# Patient Record
Sex: MALE | Race: WHITE | NOT HISPANIC OR LATINO
[De-identification: names, ages, dates, MRNs, and addresses within clinical notes are randomized per-mention and may not be internally consistent; named-entity substitution may affect disease eponyms.]

---

## 2022-12-27 PROBLEM — Z00.00 ENCOUNTER FOR PREVENTIVE HEALTH EXAMINATION: Status: ACTIVE | Noted: 2022-12-27

## 2022-12-28 ENCOUNTER — APPOINTMENT (OUTPATIENT)
Dept: ORTHOPEDIC SURGERY | Facility: CLINIC | Age: 62
End: 2022-12-28

## 2022-12-28 VITALS — WEIGHT: 185 LBS | HEIGHT: 67 IN | BODY MASS INDEX: 29.03 KG/M2

## 2022-12-28 DIAGNOSIS — Z78.9 OTHER SPECIFIED HEALTH STATUS: ICD-10-CM

## 2022-12-28 PROCEDURE — 72040 X-RAY EXAM NECK SPINE 2-3 VW: CPT

## 2022-12-28 PROCEDURE — 73010 X-RAY EXAM OF SHOULDER BLADE: CPT | Mod: RT

## 2022-12-28 PROCEDURE — 73030 X-RAY EXAM OF SHOULDER: CPT | Mod: RT

## 2022-12-28 RX ORDER — METHYLPREDNISOLONE 4 MG/1
4 TABLET ORAL
Qty: 1 | Refills: 0 | Status: ACTIVE | COMMUNITY
Start: 2022-12-28 | End: 1900-01-01

## 2022-12-28 NOTE — IMAGING
[de-identified] : RIGHT SHOULDER \par No swelling, no ecchymosis, no deformity, no scapular winging.\par No tenderness to palpation over shoulder, AC joint or trapezius.\par Forward flexion to 180; external rotation to 90 degrees (with shoulder abducted); internal rotation to 70 degrees (with shoulder abducted)\par 5/5 supraspinatus, infraspinatus and subscapularis\par Negative Basurto test, negative impingement sign, negative O’Tyrese test.\par Speed’s and yergason negative\par Motor and sensory intact distally\par \par \par CERVICAL:\par No swelling, no ecchymosis\par Right Trapezial and paracervical tenderness to palpation\par Diminished range of motion in all planes; pain radiates down arm with neck rotation\par 5/5 deltoid, biceps, triceps and wrist flexors/extensors\par Positive spurling, negative calero\par Reflexes +2, intact motor distally\par Altered sensation distally\par  [Straightening consistent with spasm] : Straightening consistent with spasm [Disc space narrowing] : Disc space narrowing [Right] : right shoulder [There are no fractures, subluxations or dislocations. No significant abnormalities are seen] : There are no fractures, subluxations or dislocations. No significant abnormalities are seen

## 2022-12-28 NOTE — HISTORY OF PRESENT ILLNESS
[6] : 6 [5] : 5 [Dull/Aching] : dull/aching [Ice] : ice [de-identified] : 12/28/22:  ACUTE ONSET OF RIGHT SHOULDER PAIN SINCE EARLY DECEMBER 2022 AFTER DOING YARDWORK.  NO PRIOR RIGHT SHOULDER ISSUES.  PAIN WORSE WITH REACHING, LIFTING AND AT NIGHT.  REPORTS TO A SHOOTING PAIN DOWN THE RIGTH ARM AT TIMES.  OCC TINGLING AS WELL.  WENT TO THE ER WHERE XRAYS TAKEN AND GIVE ORAL MEDS.  REST, ICE, PERCOCET 5/325 AND NSAIDS PRN WITH NO RELIEF.  [] : no [FreeTextEntry1] : R SHOULDER [FreeTextEntry3] : 3 WEEKS AGO [FreeTextEntry5] : WAS DOING WORK IN HIS YARD AND DEVELOPED PAIN AFTER. WENT TO [place] [facility] 2 WEEKS AGO THEN VU BERNAL-12/26/22. HAS SOME NUMBNESS IN RIGHT IF,MF. WAS PRESCRIBED OXY BY [place] [employer] [de-identified] : ACTIVITY [de-identified] : XR

## 2022-12-28 NOTE — ASSESSMENT
[FreeTextEntry1] : RIGHT CERVICAL RADICULITIS FROM DOING YARD WORK\par CLASSIC SPURLING ON EXAM\par NO RED FLAGS\par MDP AND COURSE OF PT\par F/U WITH OUR SPINE TEAM IN 4 WEEKS IF PERSISTS

## 2023-03-01 ENCOUNTER — APPOINTMENT (OUTPATIENT)
Dept: ORTHOPEDIC SURGERY | Facility: CLINIC | Age: 63
End: 2023-03-01

## 2023-09-06 ENCOUNTER — APPOINTMENT (OUTPATIENT)
Dept: ORTHOPEDIC SURGERY | Facility: CLINIC | Age: 63
End: 2023-09-06
Payer: COMMERCIAL

## 2023-09-06 VITALS — BODY MASS INDEX: 29.03 KG/M2 | WEIGHT: 185 LBS | HEIGHT: 67 IN

## 2023-09-06 DIAGNOSIS — M75.81 OTHER SHOULDER LESIONS, RIGHT SHOULDER: ICD-10-CM

## 2023-09-06 DIAGNOSIS — M75.51 BURSITIS OF RIGHT SHOULDER: ICD-10-CM

## 2023-09-06 DIAGNOSIS — M54.12 RADICULOPATHY, CERVICAL REGION: ICD-10-CM

## 2023-09-06 PROCEDURE — 99213 OFFICE O/P EST LOW 20 MIN: CPT

## 2023-09-06 RX ORDER — MELOXICAM 15 MG/1
15 TABLET ORAL DAILY
Qty: 30 | Refills: 2 | Status: COMPLETED | COMMUNITY
Start: 2023-09-06 | End: 2023-12-05

## 2023-09-06 NOTE — ASSESSMENT
[FreeTextEntry1] : RIGHT CERVICAL RADICULITIS increased demands CLASSIC SPURLING ON EXAM not as severe as prior. discussed possib medrol pack but he does not feel it is that severe will give rx for mobic. if symptoms persist discussed possible mri and pain mgt consult for nayely.

## 2023-09-06 NOTE — HISTORY OF PRESENT ILLNESS
[5] : 5 [Dull/Aching] : dull/aching [6] : 6 [Ice] : ice [de-identified] : 9-6-23-  we saw him in december with acute right upper extremity radiculopathy. He did well with medrol. states over the last few months less severe radicular pain episodiacally right upper extremity into the 3/4 fingers on the right hand with reaching and increased activities.   12/28/22:  ACUTE ONSET OF RIGHT SHOULDER PAIN SINCE EARLY DECEMBER 2022 AFTER DOING YARDWORK.  NO PRIOR RIGHT SHOULDER ISSUES.  PAIN WORSE WITH REACHING, LIFTING AND AT NIGHT.  REPORTS TO A SHOOTING PAIN DOWN THE RIGTH ARM AT TIMES.  OCC TINGLING AS WELL.  WENT TO THE ER WHERE XRAYS TAKEN AND GIVE ORAL MEDS.  REST, ICE, PERCOCET 5/325 AND NSAIDS PRN WITH NO RELIEF.  [] : no [FreeTextEntry1] : R SHOULDER [FreeTextEntry3] : 3 WEEKS AGO [FreeTextEntry5] : WAS DOING WORK IN HIS YARD AND DEVELOPED PAIN AFTER. WENT TO [place] [facility] 2 WEEKS AGO THEN VU BERNAL-12/26/22. HAS SOME NUMBNESS IN RIGHT IF,MF. WAS PRESCRIBED OXY BY [place] [employer] [de-identified] : ACTIVITY [de-identified] : XR [de-identified] : meds

## 2023-10-03 ENCOUNTER — LABORATORY RESULT (OUTPATIENT)
Age: 63
End: 2023-10-03

## 2023-10-03 ENCOUNTER — APPOINTMENT (OUTPATIENT)
Dept: CARDIOLOGY | Facility: CLINIC | Age: 63
End: 2023-10-03
Payer: COMMERCIAL

## 2023-10-03 ENCOUNTER — NON-APPOINTMENT (OUTPATIENT)
Age: 63
End: 2023-10-03

## 2023-10-03 VITALS
TEMPERATURE: 97.3 F | HEIGHT: 67 IN | OXYGEN SATURATION: 97 % | WEIGHT: 183 LBS | DIASTOLIC BLOOD PRESSURE: 80 MMHG | BODY MASS INDEX: 28.72 KG/M2 | HEART RATE: 59 BPM | SYSTOLIC BLOOD PRESSURE: 145 MMHG

## 2023-10-03 DIAGNOSIS — Z00.00 ENCOUNTER FOR GENERAL ADULT MEDICAL EXAMINATION W/OUT ABNORMAL FINDINGS: ICD-10-CM

## 2023-10-03 DIAGNOSIS — Z23 ENCOUNTER FOR IMMUNIZATION: ICD-10-CM

## 2023-10-03 PROCEDURE — 99387 INIT PM E/M NEW PAT 65+ YRS: CPT | Mod: 25

## 2023-10-03 PROCEDURE — 90662 IIV NO PRSV INCREASED AG IM: CPT

## 2023-10-03 PROCEDURE — G0008: CPT

## 2023-10-03 PROCEDURE — 93000 ELECTROCARDIOGRAM COMPLETE: CPT

## 2023-10-04 ENCOUNTER — TRANSCRIPTION ENCOUNTER (OUTPATIENT)
Age: 63
End: 2023-10-04

## 2023-10-04 LAB
25(OH)D3 SERPL-MCNC: 21.7 NG/ML
ALBUMIN SERPL ELPH-MCNC: 4.8 G/DL
ALP BLD-CCNC: 81 U/L
ALT SERPL-CCNC: 34 U/L
ANION GAP SERPL CALC-SCNC: 16 MMOL/L
APO LP(A) SERPL-MCNC: 34.8 NMOL/L
APPEARANCE: CLEAR
AST SERPL-CCNC: 27 U/L
BACTERIA: NEGATIVE /HPF
BILIRUB DIRECT SERPL-MCNC: 0.3 MG/DL
BILIRUB INDIRECT SERPL-MCNC: 1.3 MG/DL
BILIRUB SERPL-MCNC: 1.6 MG/DL
BILIRUBIN URINE: NEGATIVE
BLOOD URINE: NEGATIVE
BUN SERPL-MCNC: 11 MG/DL
CALCIUM SERPL-MCNC: 9.8 MG/DL
CAST: 0 /LPF
CHLORIDE SERPL-SCNC: 100 MMOL/L
CHOLEST SERPL-MCNC: 235 MG/DL
CK SERPL-CCNC: 76 U/L
CO2 SERPL-SCNC: 22 MMOL/L
COLOR: YELLOW
CREAT SERPL-MCNC: 0.82 MG/DL
EGFR: 97 ML/MIN/1.73M2
EPITHELIAL CELLS: 0 /HPF
ESTIMATED AVERAGE GLUCOSE: 209 MG/DL
GLUCOSE QUALITATIVE U: NEGATIVE MG/DL
GLUCOSE SERPL-MCNC: 192 MG/DL
HBA1C MFR BLD HPLC: 8.9 %
HDLC SERPL-MCNC: 50 MG/DL
KETONES URINE: NEGATIVE MG/DL
LDLC SERPL CALC-MCNC: 154 MG/DL
LDLC SERPL DIRECT ASSAY-MCNC: 165 MG/DL
LEUKOCYTE ESTERASE URINE: NEGATIVE
MAGNESIUM SERPL-MCNC: 2.3 MG/DL
MICROSCOPIC-UA: NORMAL
NITRITE URINE: NEGATIVE
NONHDLC SERPL-MCNC: 185 MG/DL
OSMOLALITY SERPL: 296 MOSMOL/KG
PH URINE: 6
PHOSPHATE SERPL-MCNC: 2.3 MG/DL
POTASSIUM SERPL-SCNC: 4.5 MMOL/L
PROT SERPL-MCNC: 7.8 G/DL
PROTEIN URINE: NEGATIVE MG/DL
PSA SERPL-MCNC: 1.02 NG/ML
RED BLOOD CELLS URINE: 0 /HPF
SODIUM SERPL-SCNC: 138 MMOL/L
SPECIFIC GRAVITY URINE: 1.01
T3RU NFR SERPL: 1.2 TBI
T4 FREE SERPL-MCNC: 1.2 NG/DL
T4 SERPL-MCNC: 7.8 UG/DL
TRIGL SERPL-MCNC: 173 MG/DL
TSH SERPL-ACNC: 1.89 UIU/ML
URATE SERPL-MCNC: 6.6 MG/DL
UROBILINOGEN URINE: 0.2 MG/DL
WHITE BLOOD CELLS URINE: 0 /HPF

## 2023-10-12 ENCOUNTER — TRANSCRIPTION ENCOUNTER (OUTPATIENT)
Age: 63
End: 2023-10-12

## 2023-10-26 ENCOUNTER — APPOINTMENT (OUTPATIENT)
Dept: ENDOCRINOLOGY | Facility: CLINIC | Age: 63
End: 2023-10-26
Payer: COMMERCIAL

## 2023-10-26 VITALS
TEMPERATURE: 97 F | DIASTOLIC BLOOD PRESSURE: 80 MMHG | SYSTOLIC BLOOD PRESSURE: 130 MMHG | HEIGHT: 67 IN | OXYGEN SATURATION: 97 % | WEIGHT: 181 LBS | HEART RATE: 74 BPM | BODY MASS INDEX: 28.41 KG/M2

## 2023-10-26 DIAGNOSIS — E78.5 HYPERLIPIDEMIA, UNSPECIFIED: ICD-10-CM

## 2023-10-26 DIAGNOSIS — E11.9 TYPE 2 DIABETES MELLITUS W/OUT COMPLICATIONS: ICD-10-CM

## 2023-10-26 DIAGNOSIS — E55.9 VITAMIN D DEFICIENCY, UNSPECIFIED: ICD-10-CM

## 2023-10-26 DIAGNOSIS — I10 ESSENTIAL (PRIMARY) HYPERTENSION: ICD-10-CM

## 2023-10-26 LAB — GLUCOSE BLDC GLUCOMTR-MCNC: 93

## 2023-10-26 PROCEDURE — 99214 OFFICE O/P EST MOD 30 MIN: CPT

## 2023-10-26 PROCEDURE — 82962 GLUCOSE BLOOD TEST: CPT

## 2023-10-26 RX ORDER — ERGOCALCIFEROL 1.25 MG/1
1.25 MG CAPSULE, LIQUID FILLED ORAL
Qty: 6 | Refills: 1 | Status: ACTIVE | COMMUNITY
Start: 2023-10-26 | End: 1900-01-01

## 2023-10-26 RX ORDER — BLOOD-GLUCOSE METER
W/DEVICE KIT MISCELLANEOUS
Qty: 1 | Refills: 0 | Status: ACTIVE | COMMUNITY
Start: 2023-10-26 | End: 1900-01-01

## 2023-10-26 RX ORDER — BLOOD-GLUCOSE METER
KIT MISCELLANEOUS
Qty: 90 | Refills: 0 | Status: ACTIVE | COMMUNITY
Start: 2023-10-26 | End: 1900-01-01

## 2023-10-26 RX ORDER — ALCOHOL ANTISEPTIC PADS
PADS, MEDICATED (EA) TOPICAL
Qty: 90 | Refills: 0 | Status: ACTIVE | COMMUNITY
Start: 2023-10-26 | End: 1900-01-01

## 2023-10-26 RX ORDER — METFORMIN HYDROCHLORIDE 500 MG/1
500 TABLET, COATED ORAL DAILY
Qty: 90 | Refills: 3 | Status: ACTIVE | COMMUNITY
Start: 2023-10-04 | End: 1900-01-01

## 2023-11-14 ENCOUNTER — TRANSCRIPTION ENCOUNTER (OUTPATIENT)
Age: 63
End: 2023-11-14

## 2024-01-15 ENCOUNTER — RX RENEWAL (OUTPATIENT)
Age: 64
End: 2024-01-15

## 2024-01-22 ENCOUNTER — APPOINTMENT (OUTPATIENT)
Dept: ENDOCRINOLOGY | Facility: CLINIC | Age: 64
End: 2024-01-22

## 2024-02-15 ENCOUNTER — APPOINTMENT (OUTPATIENT)
Dept: ENDOCRINOLOGY | Facility: CLINIC | Age: 64
End: 2024-02-15

## 2025-05-15 ENCOUNTER — APPOINTMENT (OUTPATIENT)
Dept: ENDOCRINOLOGY | Facility: CLINIC | Age: 65
End: 2025-05-15